# Patient Record
Sex: FEMALE | Race: BLACK OR AFRICAN AMERICAN
[De-identification: names, ages, dates, MRNs, and addresses within clinical notes are randomized per-mention and may not be internally consistent; named-entity substitution may affect disease eponyms.]

---
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
## 2021-02-08 ENCOUNTER — HOSPITAL ENCOUNTER (OUTPATIENT)
Dept: HOSPITAL 47 - RADUSWWP | Age: 21
Discharge: HOME | End: 2021-02-08
Attending: PEDIATRICS
Payer: COMMERCIAL

## 2021-02-08 DIAGNOSIS — N10: ICD-10-CM

## 2021-02-08 DIAGNOSIS — R30.0: ICD-10-CM

## 2021-02-08 DIAGNOSIS — N83.8: Primary | ICD-10-CM

## 2021-02-08 DIAGNOSIS — N30.80: ICD-10-CM

## 2021-02-08 LAB
ALBUMIN SERPL-MCNC: 5.1 G/DL (ref 3.5–5)
ALP SERPL-CCNC: 97 U/L (ref 38–126)
ALT SERPL-CCNC: 17 U/L (ref 4–34)
ANION GAP SERPL CALC-SCNC: 12 MMOL/L
AST SERPL-CCNC: 25 U/L (ref 14–36)
BASOPHILS # BLD AUTO: 0 K/UL (ref 0–0.2)
BASOPHILS NFR BLD AUTO: 0 %
BUN SERPL-SCNC: 12 MG/DL (ref 7–17)
CALCIUM SPEC-MCNC: 10.1 MG/DL (ref 8.4–10.2)
CHLORIDE SERPL-SCNC: 102 MMOL/L (ref 98–107)
CO2 SERPL-SCNC: 27 MMOL/L (ref 22–30)
EOSINOPHIL # BLD AUTO: 0.1 K/UL (ref 0–0.7)
EOSINOPHIL NFR BLD AUTO: 0 %
ERYTHROCYTE [DISTWIDTH] IN BLOOD BY AUTOMATED COUNT: 5.47 M/UL (ref 3.8–5.4)
ERYTHROCYTE [DISTWIDTH] IN BLOOD: 13.9 % (ref 11.5–15.5)
GLUCOSE SERPL-MCNC: 96 MG/DL (ref 74–99)
HCT VFR BLD AUTO: 42.4 % (ref 34–46)
HGB BLD-MCNC: 13.6 GM/DL (ref 11.4–16)
LYMPHOCYTES # SPEC AUTO: 1.7 K/UL (ref 1–4.8)
LYMPHOCYTES NFR SPEC AUTO: 13 %
MCH RBC QN AUTO: 24.9 PG (ref 25–35)
MCHC RBC AUTO-ENTMCNC: 32.1 G/DL (ref 31–37)
MCV RBC AUTO: 77.6 FL (ref 80–100)
MONOCYTES # BLD AUTO: 0.8 K/UL (ref 0–1)
MONOCYTES NFR BLD AUTO: 6 %
NEUTROPHILS # BLD AUTO: 10.1 K/UL (ref 1.3–7.7)
NEUTROPHILS NFR BLD AUTO: 78 %
PLATELET # BLD AUTO: 202 K/UL (ref 150–450)
POTASSIUM SERPL-SCNC: 4.1 MMOL/L (ref 3.5–5.1)
PROT SERPL-MCNC: 8.9 G/DL (ref 6.3–8.2)
SODIUM SERPL-SCNC: 141 MMOL/L (ref 137–145)
WBC # BLD AUTO: 12.9 K/UL (ref 3.8–10.6)

## 2021-02-08 PROCEDURE — 86141 C-REACTIVE PROTEIN HS: CPT

## 2021-02-08 PROCEDURE — 76856 US EXAM PELVIC COMPLETE: CPT

## 2021-02-08 PROCEDURE — 76770 US EXAM ABDO BACK WALL COMP: CPT

## 2021-02-08 PROCEDURE — 85025 COMPLETE CBC W/AUTO DIFF WBC: CPT

## 2021-02-08 PROCEDURE — 80053 COMPREHEN METABOLIC PANEL: CPT

## 2021-02-08 PROCEDURE — 76830 TRANSVAGINAL US NON-OB: CPT

## 2021-02-08 NOTE — US
EXAMINATION TYPE: US kidneys/renal and bladder

 

DATE OF EXAM: 2/8/2021

 

COMPARISON: NONE

 

CLINICAL HISTORY: 21-year-old female N39.0 UTI R30.0 Dysuria.

 

TECHNIQUE: Multiple sonographic images of the kidneys and bladder are obtained.

 

FINDINGS:

 

EXAM MEASUREMENTS:

 

Right Kidney:  9.2 x 3.4 x 4.6 cm

Left Kidney: 8.6 x 5.0 x 4.7 cm

 

No hydronephrosis on either side.

 

Bladder: wnl

**Bilateral Jets seen: Yes

 

 

IMPRESSION:

No hydronephrosis.

## 2021-02-08 NOTE — US
EXAMINATION TYPE: US pelvic complete

 

DATE OF EXAM: 2/8/2021

 

COMPARISON: NONE

 

CLINICAL HISTORY: 21-year-old female N39.0 UTI R30.0 Dysuria.

 

TECHNIQUE:  Transvaginal (TV) and Transabdominal (TA) .  Transabdominal sonographic images of the pel
vis were acquired.  Transvaginal sonographic images were medically necessary to better assess the fol
lowing anatomy: ovaries

 

Date of LMP:  1-9-21

 

FINDINGS:

 

EXAM MEASUREMENTS:

 

Uterus:  8.4 x 3.5 x 3.9 cm

Endometrial Stripe: 0.5 cm

Right Ovary:  5.1 x 3.8 x 3.1 cm for a volume of 30.0 mL

Left Ovary:  4.9 x 1.7 x 1.9 cm for a volume of 8.0 mL.

 

 

 

1. Uterus:  Anteverted and otherwise  wnl

2. Endometrium:  wnl

3. Right Ovary:  measures large. Lobulated in shape with multiple small peripherally oriented follicl
es and echogenic central stroma. There is satisfactory arterial and venous flow demonstrated with spe
ctral Doppler analysis.

4. Left Ovary: Follicular change is noted.

5. Bilateral Adnexa:  wnl

6. Posterior cul-de-sac:  wnl

 

 

 

IMPRESSION: 

1. The right ovary is large and lobulated with a volume of 30.0 mL. Satisfactory arterial and venous 
flow argues against ovarian torsion. Given the multiple small peripheral follicles and large size, co
nsider a polycystic ovary.

2. Otherwise, the left ovary and uterus appear unremarkable.

## 2021-02-09 LAB — CRP SERPL HS-MCNC: 0.75 MG/L (ref 0–3)
